# Patient Record
(demographics unavailable — no encounter records)

---

## 2024-12-17 NOTE — HISTORY OF PRESENT ILLNESS
[de-identified] : s/p open septoR, bl spreaders 12/16/24 pt here with dad, congested, taking Tylenol for pain which helps. she took Oxy before bed and slept well. changing the drip bandage frequently

## 2024-12-17 NOTE — END OF VISIT
[FreeTextEntry3] : I personally saw and examined KAROL CABA  in detail. I spoke to FIORDALIZA Gil regarding the assessment and plan of care. I performed the procedures and relevant physical exam. I have made changes to the body of the note wherever necessary and appropriate.

## 2024-12-17 NOTE — PHYSICAL EXAM
[Normal] : normal appearance, well groomed, well nourished, and in no acute distress [de-identified] : external splint [de-identified] : moderate crusting b/l nares

## 2024-12-17 NOTE — REASON FOR VISIT
[Post-Operative Visit] : a post-operative visit [FreeTextEntry2] : s/p open septoR, bl spreaders 12/16/24

## 2024-12-17 NOTE — ASSESSMENT
[FreeTextEntry1] : Ms. CABA s/p open septoR, bl spreaders 12/16/24   POD1, doing well.   - can start small squirts of nasal saline spray today and continue TID followed by peroxide cleaning and bacitracin - pt shown how to clean with q-tips and peroxide followed by bacitracin, written instruction provided - nasal tip dressing re applied, pt can stop using once no longer necessary - f/up next week 12/26 for dressing and splint removal

## 2024-12-26 NOTE — ASSESSMENT
[FreeTextEntry1] : Ms. CABA s/p open septoR, bl spreaders 12/16/24, External splint removed without issue.  Septum appears intact.     - c/w nasal saline x 1 more week, can use larger squirts at this point - ok to wash face normally, recommend using astringents to clean skin of nose - no strenuous activity for 1 week, no heavy lifting for 2 weeks, no glasses for 3 weeks - may blow your nose in another week - ok to dc peroxide cleanings. - f/u 3 weeks

## 2024-12-26 NOTE — HISTORY OF PRESENT ILLNESS
[de-identified] : s/p open septoR, bl spreaders 12/16/24  [FreeTextEntry1] : pt here for 1 week f/u with mom splint fell off earlier in the week, she re taped herself. no pain or drainage

## 2025-01-14 NOTE — ASSESSMENT
[FreeTextEntry1] : Ms. CABA is a 19 year female here with mom s/p open septoR, bl spreaders 12/16/24  - may resume all prior activities, no contact sports for 2 more months - follow up in 2 months - photos to be taken at that time, likely will repierce the nose after that appt

## 2025-01-14 NOTE — HISTORY OF PRESENT ILLNESS
[de-identified] : s/p open septoR, bl spreaders 12/16/24 1 month f/u doing well  no pain she is taping the nose at night and is happy with how she is healing

## 2025-01-15 NOTE — HISTORY OF PRESENT ILLNESS
[FreeTextEntry1] : Pt is a 19-year-old female with history of ADHD, anxiety/ depression and hypokalemia who came to the clinic for the initial evaluation of hypokalemia.  Pt is accompanied by her mother.    Pt says she was diagnosed with hypokalemia in September 2023 at her school. She says she was given oral potassium tablets ~1 week. Pt recently underwent septoplasty at [facility] on 11/27/24. Blood work done prior to the surgery showed serum potassium low at 2.8 (11/27/24). Recent serum potassium remained low at 3 on 12/16/24. Scr was stable at 0.76 on 11/27/24. Pt denies any history of nausea/ vomiting, diarrhea. Also denies any OTC meds intake. Endorses good appetite.

## 2025-01-15 NOTE — PHYSICAL EXAM
[General Appearance - Alert] : alert [General Appearance - In No Acute Distress] : in no acute distress [Sclera] : the sclera and conjunctiva were normal [Outer Ear] : the ears and nose were normal in appearance [Jugular Venous Distention Increased] : there was no jugular-venous distention [Auscultation Breath Sounds / Voice Sounds] : lungs were clear to auscultation bilaterally [Heart Sounds] : normal S1 and S2 [Heart Sounds Gallop] : no gallops [Bowel Sounds] : normal bowel sounds [Edema] : there was no peripheral edema [Abdomen Soft] : soft [No CVA Tenderness] : no ~M costovertebral angle tenderness [Nail Clubbing] : no clubbing  or cyanosis of the fingernails [] : no rash [No Focal Deficits] : no focal deficits [Affect] : the affect was normal [Mood] : the mood was normal

## 2025-01-15 NOTE — ASSESSMENT
[FreeTextEntry1] : 1.Hypokalemia: Of unclear etiology. No sign/ history of GI loss. Pt is currently taking Vyvanse for her ADHD which is known to cause hypokalemia. Will check serum potassium, serum magnesium, urine potassium, urine creatinine and urine sodium levels. Advised to improve oral potassium containing foods. Monitor serum potassium.   45 minutes: spent in obtaining and reviewing previous records, performing the visit, counseling/coordination of care, creating orders, and documenting the encounter.

## 2025-03-19 NOTE — REASON FOR VISIT
[Subsequent Evaluation] : a subsequent evaluation for [FreeTextEntry2] : s/p open septoR, bl spreaders 12/16/24

## 2025-03-19 NOTE — PHYSICAL EXAM
[Normal] : no abnormal secretions [de-identified] : Slightly prominent right alar strut graft, but it is adjacent to the nose piercing

## 2025-03-19 NOTE — HISTORY OF PRESENT ILLNESS
[de-identified] : s/p open septoR, bl spreaders 12/16/24 1 month she is taping the nose at night and is happy with how she is healing  [FreeTextEntry1] : pt here for 3 month f/u with dad was taping the nose at night but stopped because she felt it made breathing more difficult, but breathing is 70% improved since surgery Has some concerns with regards to the right alar strut graft region feels that it is more prominent than the left also feels she has generalized tip swelling

## 2025-03-19 NOTE — ASSESSMENT
[FreeTextEntry1] : Ms. CABA is a 19 year female here with dad s/p open septoR, bl spreaders 12/16/24 doing well She has generalized residual tip swelling which is early in its resolution.  She does have some slight prominence of the right alar strut graft, but I am very hesitant to inject it at this point as I do not want to compromise its integrity.  I would be more open to injected if it remains prominent at her next visit at 6 months postop.  Some of the prominence may be secondary to its adjacent piercing and therefore changes with shadowing.  Her nasal dorsum looks good and is moving along ahead of schedule.  I also would consider injection of 5-FU at her next visit in the nasal tip if she remains swollen   - advised nasal tip swelling will continue to improve over the next year - no restrictions at this point - photos taken today - can repierce the nose  - f/u 3 months, would consider K10 R strut if still with some thickness and 5-FU to the whole tip

## 2025-06-19 NOTE — PHYSICAL EXAM
[Normal] : no abnormal secretions [de-identified] : Slightly prominent right alar strut graft compared to left, but it is adjacent to the nose piercing

## 2025-06-19 NOTE — HISTORY OF PRESENT ILLNESS
[de-identified] : s/p open septoR, bl spreaders 12/16/24 1 month she is taping the nose at night and is happy with how she is healing 3 month f/u breathing 70% improved, concerned with nasal tip swelling and swelling around R alar strut graft  [FreeTextEntry1] : pt here for 6 month f/u she did not re hebert the nose yet she feels this allergy season is better than it was last year but does have some congestion. not interested in any tx or nasal sprays at this time feels the nose is still more swollen on the R side, has improved but minimal

## 2025-06-19 NOTE — ASSESSMENT
[FreeTextEntry1] : Ms. CABA is a 19 year female here with dad s/p open septoR, bl spreaders 12/16/24 doing well  still with slight prominence of the right alar strut graft,   - advised nasal tip swelling will continue to improve over the next year - K10 to R alar graft with instructions for manual massage - no restrictions at this point - photos taken today at 600 - can re hebert the nose  - f/u October when on school break would consider repeat K10 injection at that time

## 2025-06-19 NOTE — PROCEDURE
[FreeTextEntry3] : Kenalog 10 was used to inject the hypertrophic area after obtaining verbal consent.  Skin was first cleansed with alcohol.  Then 0.05 cc k10 was injected with 30 gauge needle.  Patient tolerated this very well.   NDC:8134-0931-48 Lot: [id number] Exp: 03/2026

## 2025-07-14 NOTE — PHYSICAL EXAM
[General Appearance - Alert] : alert [General Appearance - In No Acute Distress] : in no acute distress [Sclera] : the sclera and conjunctiva were normal [Outer Ear] : the ears and nose were normal in appearance [Jugular Venous Distention Increased] : there was no jugular-venous distention [Auscultation Breath Sounds / Voice Sounds] : lungs were clear to auscultation bilaterally [Heart Sounds] : normal S1 and S2 [Heart Sounds Gallop] : no gallops [Edema] : there was no peripheral edema [Bowel Sounds] : normal bowel sounds [Abdomen Soft] : soft [No CVA Tenderness] : no ~M costovertebral angle tenderness [Nail Clubbing] : no clubbing  or cyanosis of the fingernails [] : no rash [No Focal Deficits] : no focal deficits [Affect] : the affect was normal [Mood] : the mood was normal

## 2025-07-15 NOTE — HISTORY OF PRESENT ILLNESS
Received a call from Dr. Karin Harvey [place] [facility]. Pt is there with a HA and comes there frequently for her HAs and is telling him that her neurologist recommends they give her hydromorphone (dilaudid) when she has a migraine. He asks if that is true. I reviewed Dr. Leonides Kenyon last [address] note 10/14/23 at which the pt reported good control of her MHAs on Ajovy and Botox and not requiring any ED or urgent care visits. There is no mention of dilaudid as rescue therapy. Our practice does not typically endorse opioids of any kind for migraine HAs. He thanked me for the information. [FreeTextEntry1] : Pt is a 19-year-old female with history of ADHD, anxiety/ depression and hypokalemia and hypomagnesemia who came to the clinic for the follow up visit. pt was last seen for the initial evaluation of hypokalemia on 1/15/25.   Kidney history: Pt says she was diagnosed with hypokalemia in September 2023 at her school. She says she was given oral potassium tablets ~1 week. Pt underwent septoplasty at [facility] on 11/27/24. Blood work done prior to the surgery showed serum potassium low at 2.8 (11/27/24).  Serum K trend -   3.1(1/5/25), 3.1 (5/31/25), 3.2 (6/15/25) Serum Mg trend   1.1(1/15/25), 1.5 (3/19/25), 1.2 (5/31/25) Urine magnesium excretion ~ 1.5%   Pt denies any history of nausea/ vomiting, diarrhea. Also denies any OTC meds intake. Endorses good appetite.  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  Amiloride was increased to 10 mg daily on 6/20/25.   Pt. currently feels well. Pt. denies any chest pain, SOB, nausea, dysuria, weakness.   She says she stopped taking potassium tablets ~ 1 week ago. She says she is taking amiloride 5 mg daily for now.

## 2025-07-15 NOTE — HISTORY OF PRESENT ILLNESS
[FreeTextEntry1] : Pt is a 19-year-old female with history of ADHD, anxiety/ depression and hypokalemia and hypomagnesemia who came to the clinic for the follow up visit. pt was last seen for the initial evaluation of hypokalemia on 1/15/25.   Kidney history: Pt says she was diagnosed with hypokalemia in September 2023 at her school. She says she was given oral potassium tablets ~1 week. Pt underwent septoplasty at [facility] on 11/27/24. Blood work done prior to the surgery showed serum potassium low at 2.8 (11/27/24).  Serum K trend -   3.1(1/5/25), 3.1 (5/31/25), 3.2 (6/15/25) Serum Mg trend   1.1(1/15/25), 1.5 (3/19/25), 1.2 (5/31/25) Urine magnesium excretion ~ 1.5%   Pt denies any history of nausea/ vomiting, diarrhea. Also denies any OTC meds intake. Endorses good appetite.  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  Amiloride was increased to 10 mg daily on 6/20/25.   Pt. currently feels well. Pt. denies any chest pain, SOB, nausea, dysuria, weakness.   She says she stopped taking potassium tablets ~ 1 week ago. She says she is taking amiloride 5 mg daily for now.

## 2025-07-15 NOTE — ASSESSMENT
[FreeTextEntry1] : 1.Renal tubular acidosis/ Hypokalemia and hypomagnesemia: Pt with findings of hypokalemia and hypomagnesemia.  No sign/ history of GI loss. Pt is currently taking Vyvanse for her ADHD which is known to cause hypokalemia. Serum K remains low at 3.1 with serum Mg low at 1.2 with Urine Magnesium excretion high at 1.5 % on 5/31/25. The labs suggestive of renal potassium and magnesium wasting/ tubulopathies (RTA). I advised to start taking Amiloride 5 mg daily which was increased to 10 mg daily on 6/20/25.  Advised to be compliant with meds. Continue taking KCL and Magnesium supplements. Will check serum potassium, serum magnesium, urine potassium, serum vit D, UA, urine creatinine and urine potassium levels. Advised to improve oral potassium containing foods.  She wishes to try oral KCL powder instead of tabs. Oral KCL 20mEq powder twice daily prescribed. Monitor serum potassium.   35 minutes: spent in obtaining and reviewing previous records, performing the visit, counseling/coordination of care, creating orders, and documenting the encounter.

## 2025-07-15 NOTE — REVIEW OF SYSTEMS
[As Noted in HPI] : as noted in HPI [Fever] : no fever [Chills] : no chills [Feeling Poorly] : not feeling poorly [Dry Eyes] : no dryness of the eyes [Chest Pain] : no chest pain [Palpitations] : no palpitations [Lower Ext Edema] : no lower extremity edema [Cough] : no cough [SOB on Exertion] : no shortness of breath during exertion [Abdominal Pain] : no abdominal pain [Dysuria] : no dysuria [Limb Pain] : no limb pain [Itching] : no itching [Dizziness] : no dizziness [Anxiety] : no anxiety [Muscle Weakness] : no muscle weakness [FreeTextEntry4] : Nose covered with dressing bandage